# Patient Record
Sex: FEMALE | Race: BLACK OR AFRICAN AMERICAN | NOT HISPANIC OR LATINO | Employment: FULL TIME | URBAN - METROPOLITAN AREA
[De-identification: names, ages, dates, MRNs, and addresses within clinical notes are randomized per-mention and may not be internally consistent; named-entity substitution may affect disease eponyms.]

---
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
## 2020-04-20 ENCOUNTER — HOSPITAL ENCOUNTER (EMERGENCY)
Facility: HOSPITAL | Age: 20
Discharge: HOME | End: 2020-04-20
Attending: EMERGENCY MEDICINE | Admitting: EMERGENCY MEDICINE

## 2020-04-20 VITALS
RESPIRATION RATE: 16 BRPM | DIASTOLIC BLOOD PRESSURE: 92 MMHG | WEIGHT: 289 LBS | BODY MASS INDEX: 53.18 KG/M2 | OXYGEN SATURATION: 98 % | HEIGHT: 62 IN | SYSTOLIC BLOOD PRESSURE: 143 MMHG | TEMPERATURE: 98 F | HEART RATE: 98 BPM

## 2020-04-20 DIAGNOSIS — R21 RASH: ICD-10-CM

## 2020-04-20 DIAGNOSIS — T78.40XA ALLERGIC REACTION, INITIAL ENCOUNTER: Primary | ICD-10-CM

## 2020-04-20 PROCEDURE — 63710000001 PREDNISONE PER 1 MG: Performed by: EMERGENCY MEDICINE

## 2020-04-20 PROCEDURE — 99283 EMERGENCY DEPT VISIT LOW MDM: CPT

## 2020-04-20 RX ORDER — PREDNISONE 50 MG/1
50 TABLET ORAL DAILY
Qty: 4 TABLET | Refills: 0 | Status: SHIPPED | OUTPATIENT
Start: 2020-04-20 | End: 2020-04-24

## 2020-04-20 RX ORDER — FAMOTIDINE 20 MG/1
20 TABLET, FILM COATED ORAL ONCE
Status: COMPLETED | OUTPATIENT
Start: 2020-04-20 | End: 2020-04-20

## 2020-04-20 RX ORDER — DIPHENHYDRAMINE HCL 25 MG
25 TABLET ORAL EVERY 6 HOURS PRN
Qty: 20 TABLET | Refills: 0 | Status: SHIPPED | OUTPATIENT
Start: 2020-04-20

## 2020-04-20 RX ORDER — PREDNISONE 20 MG/1
40 TABLET ORAL ONCE
Status: COMPLETED | OUTPATIENT
Start: 2020-04-20 | End: 2020-04-20

## 2020-04-20 RX ORDER — FAMOTIDINE 20 MG/1
20 TABLET, FILM COATED ORAL 2 TIMES DAILY
Qty: 12 TABLET | Refills: 0 | Status: SHIPPED | OUTPATIENT
Start: 2020-04-20

## 2020-04-20 RX ADMIN — FAMOTIDINE 20 MG: 20 TABLET ORAL at 05:29

## 2020-04-20 RX ADMIN — PREDNISONE 40 MG: 20 TABLET ORAL at 05:29

## 2020-04-20 NOTE — ED PROVIDER NOTES
Subjective   Patient is a pleasant 19-year-old female who presents today with rash and itching.  She states that approximately 24 hours ago did she developed some mild erythema on her upper extremities and her proximal medial thighs.  She states that the rash is itching significantly.  She did take a dose of Benadryl yesterday which seemed to relieve her symptoms temporarily but this evening, approximately 1 to 2 hours ago, the rash returned and is the reason for visit the emergency department.  She denies similar previous episodes.  She denies shortness of breath.  She denies new cough or fever.  She denies any abdominal discomfort.  She denies nausea vomiting or diarrhea or other acute complaints.  She cannot think of any recent life changes or exposures that might have brought this rash on.  she denies any new foods in her diet.  She denies any similar previous episodes.      Rash   Location:  Shoulder/arm and leg  Shoulder/arm rash location:  L arm and R arm  Leg rash location:  L upper leg and R upper leg  Quality: itchiness and redness    Quality: not blistering, not bruising, not burning, not draining, not dry, not painful, not swelling and not weeping    Severity:  Moderate  Onset quality:  Gradual  Timing:  Constant  Progression:  Unchanged  Chronicity:  New  Context: not animal contact, not chemical exposure, not food, not hot tub use, not insect bite/sting, not medications, not new detergent/soap and not plant contact    Relieved by:  Antihistamines (temporary)  Worsened by:  Nothing  Ineffective treatments:  None tried  Associated symptoms: no abdominal pain, no diarrhea, no fatigue, no fever, no headaches, no hoarse voice, no induration, no joint pain, no myalgias, no periorbital edema, no shortness of breath, no sore throat, no tongue swelling, no URI, not vomiting and not wheezing        Review of Systems   Constitutional: Negative for fatigue and fever.   HENT: Negative for hoarse voice and sore  throat.    Respiratory: Negative for shortness of breath and wheezing.    Gastrointestinal: Negative for abdominal pain, diarrhea and vomiting.   Musculoskeletal: Negative for arthralgias and myalgias.   Skin: Positive for rash.   Neurological: Negative for headaches.   All other systems reviewed and are negative.      Past Medical History:   Diagnosis Date   • Hypertension    • Preeclampsia        No Known Allergies    History reviewed. No pertinent surgical history.    History reviewed. No pertinent family history.    Social History     Socioeconomic History   • Marital status: Single     Spouse name: Not on file   • Number of children: Not on file   • Years of education: Not on file   • Highest education level: Not on file   Tobacco Use   • Smoking status: Never Smoker   • Smokeless tobacco: Never Used   Substance and Sexual Activity   • Alcohol use: No   • Drug use: No   • Sexual activity: Yes           Objective   Physical Exam   Constitutional: She is oriented to person, place, and time. She appears well-developed and well-nourished. No distress.   HENT:   Head: Normocephalic and atraumatic.   Eyes: Pupils are equal, round, and reactive to light. Conjunctivae and EOM are normal.   Neck: Normal range of motion.   Cardiovascular: Normal rate, regular rhythm and normal heart sounds. Exam reveals no gallop and no friction rub.   No murmur heard.  Pulmonary/Chest: Effort normal and breath sounds normal. No respiratory distress.   Abdominal: Soft. Bowel sounds are normal. There is no tenderness.   Musculoskeletal: Normal range of motion.   Lymphadenopathy:     She has no cervical adenopathy.   Neurological: She is alert and oriented to person, place, and time.   Skin: Skin is warm and dry. Rash (erythema with mildly rainsed, confluent lesions primarily over the medial aspect of the proximal upper and lower extremities..) noted. She is not diaphoretic.   Psychiatric: She has a normal mood and affect.   Nursing note  and vitals reviewed.      Procedures           ED Course             MDM    Final diagnoses:   Allergic reaction, initial encounter   Rash            Edwin Owens,   04/22/20 7251